# Patient Record
Sex: MALE | URBAN - METROPOLITAN AREA
[De-identification: names, ages, dates, MRNs, and addresses within clinical notes are randomized per-mention and may not be internally consistent; named-entity substitution may affect disease eponyms.]

---
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
## 2024-01-22 ENCOUNTER — OFFICE VISIT (OUTPATIENT)
Dept: URBAN - METROPOLITAN AREA CLINIC 28 | Facility: CLINIC | Age: 19
End: 2024-01-22
Payer: COMMERCIAL

## 2024-01-22 DIAGNOSIS — H00.14 CHALAZION LEFT UPPER EYELID: Primary | ICD-10-CM

## 2024-01-22 PROCEDURE — 99213 OFFICE O/P EST LOW 20 MIN: CPT | Performed by: OPTOMETRIST

## 2024-01-22 RX ORDER — PREDNISOLONE ACETATE 10 MG/ML
1 % SUSPENSION/ DROPS OPHTHALMIC
Qty: 5 | Refills: 0 | Status: INACTIVE
Start: 2024-01-22 | End: 2024-01-22

## 2024-01-22 ASSESSMENT — INTRAOCULAR PRESSURE
OD: 19
OS: 19

## 2024-01-23 ENCOUNTER — OFFICE VISIT (OUTPATIENT)
Dept: URBAN - METROPOLITAN AREA CLINIC 24 | Facility: CLINIC | Age: 19
End: 2024-01-23
Payer: COMMERCIAL

## 2024-01-23 PROCEDURE — 92285 EXTERNAL OCULAR PHOTOGRAPHY: CPT | Performed by: STUDENT IN AN ORGANIZED HEALTH CARE EDUCATION/TRAINING PROGRAM

## 2024-01-23 PROCEDURE — 99203 OFFICE O/P NEW LOW 30 MIN: CPT | Performed by: STUDENT IN AN ORGANIZED HEALTH CARE EDUCATION/TRAINING PROGRAM

## 2024-01-26 ENCOUNTER — OFFICE VISIT (OUTPATIENT)
Dept: URBAN - METROPOLITAN AREA CLINIC 10 | Facility: CLINIC | Age: 19
End: 2024-01-26
Payer: COMMERCIAL

## 2024-01-26 PROCEDURE — 92285 EXTERNAL OCULAR PHOTOGRAPHY: CPT | Performed by: OPHTHALMOLOGY

## 2024-01-26 PROCEDURE — 99214 OFFICE O/P EST MOD 30 MIN: CPT | Performed by: OPHTHALMOLOGY

## 2024-01-26 ASSESSMENT — INTRAOCULAR PRESSURE
OD: 12
OS: 13

## 2024-02-27 ENCOUNTER — ADULT PHYSICAL (OUTPATIENT)
Dept: URBAN - METROPOLITAN AREA CLINIC 30 | Facility: CLINIC | Age: 19
End: 2024-02-27
Payer: COMMERCIAL

## 2024-02-27 DIAGNOSIS — H00.14 CHALAZION LEFT UPPER EYELID: ICD-10-CM

## 2024-02-27 DIAGNOSIS — Z01.818 ENCOUNTER FOR OTHER PREPROCEDURAL EXAMINATION: Primary | ICD-10-CM

## 2024-02-27 PROCEDURE — 99202 OFFICE O/P NEW SF 15 MIN: CPT | Performed by: PHYSICIAN ASSISTANT

## 2024-03-04 ENCOUNTER — SURGERY (OUTPATIENT)
Dept: URBAN - METROPOLITAN AREA SURGERY 5 | Facility: SURGERY | Age: 19
End: 2024-03-04
Payer: COMMERCIAL

## 2024-03-04 PROCEDURE — 67800 REMOVE EYELID LESION: CPT | Performed by: OPHTHALMOLOGY

## 2024-03-04 RX ORDER — ERYTHROMYCIN 5 MG/G
OINTMENT OPHTHALMIC
Qty: 1 | Refills: 1 | Status: ACTIVE
Start: 2024-03-04

## 2024-03-11 ENCOUNTER — POST-OPERATIVE VISIT (OUTPATIENT)
Dept: URBAN - METROPOLITAN AREA CLINIC 30 | Facility: CLINIC | Age: 19
End: 2024-03-11
Payer: COMMERCIAL

## 2024-03-11 DIAGNOSIS — Z48.89 ENCOUNTER FOR OTHER SPECIFIED SURGICAL AFTERCARE: Primary | ICD-10-CM

## 2024-03-11 DIAGNOSIS — H52.13 MYOPIA, BILATERAL: ICD-10-CM

## 2024-03-11 PROCEDURE — 99024 POSTOP FOLLOW-UP VISIT: CPT

## 2024-03-11 ASSESSMENT — VISUAL ACUITY
OD: 20/20
OS: 20/25

## 2024-03-11 ASSESSMENT — KERATOMETRY
OD: 41.37
OS: 42.71

## 2024-03-11 ASSESSMENT — INTRAOCULAR PRESSURE
OS: 16
OD: 15

## 2025-01-23 ENCOUNTER — OFFICE VISIT (OUTPATIENT)
Dept: URBAN - METROPOLITAN AREA CLINIC 28 | Facility: CLINIC | Age: 20
End: 2025-01-23
Payer: COMMERCIAL

## 2025-01-23 DIAGNOSIS — H00.015 HORDEOLUM, LEFT LOWER LID: Primary | ICD-10-CM

## 2025-01-23 PROCEDURE — 99213 OFFICE O/P EST LOW 20 MIN: CPT | Performed by: OPTOMETRIST

## 2025-01-23 RX ORDER — DOXYCYCLINE HYCLATE 100 MG/1
100 MG CAPSULE, GELATIN COATED ORAL
Qty: 20 | Refills: 0 | Status: INACTIVE
Start: 2025-01-23 | End: 2025-02-01

## 2025-01-23 RX ORDER — NEOMYCIN SULFATE, POLYMYXIN B SULFATE AND DEXAMETHASONE 3.5; 10000; 1 MG/ML; [USP'U]/ML; MG/ML
SUSPENSION/ DROPS OPHTHALMIC
Qty: 5 | Refills: 0 | Status: ACTIVE
Start: 2025-01-23

## 2025-01-23 ASSESSMENT — INTRAOCULAR PRESSURE
OD: 18
OS: 18